# Patient Record
Sex: FEMALE | Race: WHITE | URBAN - METROPOLITAN AREA
[De-identification: names, ages, dates, MRNs, and addresses within clinical notes are randomized per-mention and may not be internally consistent; named-entity substitution may affect disease eponyms.]

---

## 2021-10-22 ENCOUNTER — OFFICE VISIT (OUTPATIENT)
Dept: URBAN - METROPOLITAN AREA CLINIC 19 | Facility: CLINIC | Age: 41
End: 2021-10-22

## 2021-10-25 ENCOUNTER — WEB ENCOUNTER (OUTPATIENT)
Dept: URBAN - METROPOLITAN AREA CLINIC 19 | Facility: CLINIC | Age: 41
End: 2021-10-25

## 2021-10-25 ENCOUNTER — DASHBOARD ENCOUNTERS (OUTPATIENT)
Age: 41
End: 2021-10-25

## 2021-10-25 ENCOUNTER — OFFICE VISIT (OUTPATIENT)
Dept: URBAN - METROPOLITAN AREA CLINIC 19 | Facility: CLINIC | Age: 41
End: 2021-10-25
Payer: COMMERCIAL

## 2021-10-25 ENCOUNTER — TELEPHONE ENCOUNTER (OUTPATIENT)
Dept: URBAN - METROPOLITAN AREA CLINIC 19 | Facility: CLINIC | Age: 41
End: 2021-10-25

## 2021-10-25 DIAGNOSIS — K56.609 SMALL BOWEL OBSTRUCTION: ICD-10-CM

## 2021-10-25 DIAGNOSIS — K52.9 ILEITIS: ICD-10-CM

## 2021-10-25 PROCEDURE — 99244 OFF/OP CNSLTJ NEW/EST MOD 40: CPT | Performed by: INTERNAL MEDICINE

## 2021-10-25 PROCEDURE — 99204 OFFICE O/P NEW MOD 45 MIN: CPT | Performed by: INTERNAL MEDICINE

## 2021-10-25 RX ORDER — SODIUM, POTASSIUM,MAG SULFATES 17.5-3.13G
254 ML SOLUTION, RECONSTITUTED, ORAL ORAL ONCE
Qty: 1 KIT | Refills: 0 | OUTPATIENT
Start: 2021-10-25 | End: 2021-10-26

## 2021-10-25 NOTE — PHYSICAL EXAM GASTROINTESTINAL
Abdomen , soft, upper abdominal tenderness, mildly distended , no guarding or rigidity , no masses palpable , normal bowel sounds , Liver and Spleen , no hepatomegaly present , no hepatosplenomegaly , liver nontender , spleen not palpable Rectal deferred

## 2021-10-25 NOTE — HPI-TODAY'S VISIT:
Patient presents as a referral from her PCP, Dr. Karma Cordon, for evaluation of a partial SBO that was recently diagnosed while hospitalized at [facility] (10/5/21 to 10/9/21).  Available records were reviewed.  A copy of this note will be sent to the referring provider.  The patient states that on 10/3/21, she was in her normal state of health and actually had 2 normal bowel movements.  The following day, however, she started having severe upper abdominal pain, followed by bloating and nausea/vomiting.  She went to the [facility] ED, and her CT abdomen/pelvis showed "early versus partial distal small bowel obstruction with distended loops of distal small bowel with air-fluid level and adjacent free fluid".  The radiologist mentioned that "terminal ileitis with resultant ileus should be considered".  The patient states that an NG tube was not placed - she was just given IVFs and IV antibiotics, and her symptoms improved to the point that she could be discharged.  She still has some upper abdominal discomfort, but she is no longer nauseated.   She has seen some mucus and blood in the stool intermittently.  She has never had a colonoscopy before.  She has multiple sclerosis and takes glatiramer (Copaxone); she has been on this drug for years.  The patient states that she has had extremely irregular menses over the past few months, and she has heavy periods every 2 weeks.  She is going to discuss this with her gynecologist - the idea of endometriosis has been considered.  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  I filled out the necessary form.

## 2021-10-26 LAB
A/G RATIO: 2
ALBUMIN: 4.3
ALKALINE PHOSPHATASE: 81
ALT (SGPT): 19
AST (SGOT): 18
BILIRUBIN, TOTAL: <0.2
BUN/CREATININE RATIO: 21
BUN: 12
C-REACTIVE PROTEIN, QUANT: 6
CALCIUM: 9.1
CARBON DIOXIDE, TOTAL: 22
CHLORIDE: 104
CREATININE: 0.56
EGFR IF AFRICN AM: 132
EGFR IF NONAFRICN AM: 115
GLOBULIN, TOTAL: 2.2
GLUCOSE: 107
HEMATOCRIT: 38.5
HEMOGLOBIN: 12.6
MCH: 28.8
MCHC: 32.7
MCV: 88
NRBC: (no result)
PLATELETS: 293
POTASSIUM: 3.8
PROTEIN, TOTAL: 6.5
RBC: 4.38
RDW: 13.1
SEDIMENTATION RATE-WESTERGREN: 19
SODIUM: 142
WBC: 8.7

## 2021-11-17 ENCOUNTER — TELEPHONE ENCOUNTER (OUTPATIENT)
Dept: URBAN - METROPOLITAN AREA CLINIC 19 | Facility: CLINIC | Age: 41
End: 2021-11-17

## 2021-12-03 PROBLEM — 52457000 ILEITIS: Status: ACTIVE | Noted: 2021-10-25

## 2021-12-03 PROBLEM — 281255004 SMALL BOWEL OBSTRUCTION: Status: ACTIVE | Noted: 2021-10-25

## 2021-12-09 ENCOUNTER — TELEPHONE ENCOUNTER (OUTPATIENT)
Dept: URBAN - METROPOLITAN AREA CLINIC 19 | Facility: CLINIC | Age: 41
End: 2021-12-09

## 2021-12-13 ENCOUNTER — OFFICE VISIT (OUTPATIENT)
Dept: URBAN - METROPOLITAN AREA LAB 2 | Facility: LAB | Age: 41
End: 2021-12-13
Payer: COMMERCIAL

## 2021-12-13 DIAGNOSIS — K22.89 ESOPHAGEAL BLEEDING: ICD-10-CM

## 2021-12-13 DIAGNOSIS — D12.5 ADENOMA OF SIGMOID COLON: ICD-10-CM

## 2021-12-13 DIAGNOSIS — K92.1 ACUTE MELENA: ICD-10-CM

## 2021-12-13 DIAGNOSIS — K29.80 ACUTE DUODENITIS: ICD-10-CM

## 2021-12-13 DIAGNOSIS — R93.3 ABN FINDINGS-GI TRACT: ICD-10-CM

## 2021-12-13 PROCEDURE — 45380 COLONOSCOPY AND BIOPSY: CPT | Performed by: INTERNAL MEDICINE

## 2021-12-13 PROCEDURE — 43239 EGD BIOPSY SINGLE/MULTIPLE: CPT | Performed by: INTERNAL MEDICINE

## 2025-01-07 ENCOUNTER — OFFICE VISIT (OUTPATIENT)
Dept: URBAN - METROPOLITAN AREA CLINIC 19 | Facility: CLINIC | Age: 45
End: 2025-01-07

## 2025-01-07 NOTE — HPI-TODAY'S VISIT:
Patient presents as a referral from her PCP, Dr. Karma Cordon, for evaluation of a partial SBO that was recently diagnosed while hospitalized at [facility] (10/5/21 to 10/9/21).  Available records were reviewed.  A copy of this note will be sent to the referring provider.  The patient states that on 10/3/21, she was in her normal state of health and actually had 2 normal bowel movements.  The following day, however, she started having severe upper abdominal pain, followed by bloating and nausea/vomiting.  She went to the [facility] ED, and her CT abdomen/pelvis showed "early versus partial distal small bowel obstruction with distended loops of distal small bowel with air-fluid level and adjacent free fluid".  The radiologist mentioned that "terminal ileitis with resultant ileus should be considered".  The patient states that an NG tube was not placed - she was just given IVFs and IV antibiotics, and her symptoms improved to the point that she could be discharged.  She still has some upper abdominal discomfort, but she is no longer nauseated.   She has seen some mucus and blood in the stool intermittently.  She has never had a colonoscopy before.  She has multiple sclerosis and takes glatiramer (Copaxone); she has been on this drug for years.  The patient states that she has had extremely irregular menses over the past few months, and she has heavy periods every 2 weeks.  She is going to discuss this with her gynecologist - the idea of endometriosis has been considered.  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  I filled out the necessary form.